# Patient Record
Sex: MALE
[De-identification: names, ages, dates, MRNs, and addresses within clinical notes are randomized per-mention and may not be internally consistent; named-entity substitution may affect disease eponyms.]

---

## 2023-02-14 ENCOUNTER — RX ONLY (OUTPATIENT)
Age: 33
Setting detail: RX ONLY
End: 2023-02-14

## 2023-02-14 ENCOUNTER — APPOINTMENT (RX ONLY)
Dept: URBAN - METROPOLITAN AREA CLINIC 150 | Facility: CLINIC | Age: 33
Setting detail: DERMATOLOGY
End: 2023-02-14

## 2023-02-14 DIAGNOSIS — Z71.89 OTHER SPECIFIED COUNSELING: ICD-10-CM

## 2023-02-14 DIAGNOSIS — B35.3 TINEA PEDIS: ICD-10-CM

## 2023-02-14 PROCEDURE — 99203 OFFICE O/P NEW LOW 30 MIN: CPT

## 2023-02-14 PROCEDURE — ? PRESCRIPTION

## 2023-02-14 PROCEDURE — ? ADDITIONAL NOTES

## 2023-02-14 PROCEDURE — ? COUNSELING

## 2023-02-14 RX ORDER — CICLOPIROX OLAMINE 7.7 MG/G
CREAM TOPICAL
Qty: 90 | Refills: 12 | Status: ERX | COMMUNITY
Start: 2023-02-14

## 2023-02-14 RX ORDER — MICONAZOLE NITRATE 1 %
KIT TOPICAL
Qty: 60 | Refills: 12 | Status: ERX | COMMUNITY
Start: 2023-02-14

## 2023-02-14 ASSESSMENT — LOCATION DETAILED DESCRIPTION DERM
LOCATION DETAILED: RIGHT 3RD TOENAIL
LOCATION DETAILED: RIGHT 4TH TOENAIL
LOCATION DETAILED: LEFT DORSAL GREAT TOE
LOCATION DETAILED: LEFT 3RD TOENAIL
LOCATION DETAILED: RIGHT GREAT TOENAIL

## 2023-02-14 ASSESSMENT — LOCATION ZONE DERM
LOCATION ZONE: TOENAIL
LOCATION ZONE: TOE

## 2023-02-14 ASSESSMENT — LOCATION SIMPLE DESCRIPTION DERM
LOCATION SIMPLE: LEFT GREAT TOE
LOCATION SIMPLE: RIGHT 3RD TOE
LOCATION SIMPLE: RIGHT GREAT TOE
LOCATION SIMPLE: LEFT 3RD TOE
LOCATION SIMPLE: RIGHT 4TH TOE

## 2023-02-14 NOTE — PROCEDURE: ADDITIONAL NOTES
Detail Level: Simple
Additional Notes: 2/14/2023\\nPt will need blood work to check for liver.
Render Risk Assessment In Note?: no

## 2023-02-15 ENCOUNTER — RX ONLY (OUTPATIENT)
Age: 33
Setting detail: RX ONLY
End: 2023-02-15

## 2023-02-15 RX ORDER — TERBINAFINE HYDROCHLORIDE 250 MG/1
TABLET ORAL
Qty: 30 | Refills: 1 | Status: ERX

## 2023-02-15 RX ORDER — TERBINAFINE HYDROCHLORIDE 250 MG/1
TABLET ORAL
Qty: 30 | Refills: 1 | Status: ERX | COMMUNITY
Start: 2023-02-14

## 2023-02-15 RX ORDER — CICLOPIROX OLAMINE 7.7 MG/G
CREAM TOPICAL
Qty: 30 | Refills: 12 | Status: ERX

## 2025-05-21 ENCOUNTER — APPOINTMENT (OUTPATIENT)
Dept: URBAN - METROPOLITAN AREA CLINIC 150 | Facility: CLINIC | Age: 35
Setting detail: DERMATOLOGY
End: 2025-05-21

## 2025-05-21 DIAGNOSIS — L57.8 OTHER SKIN CHANGES DUE TO CHRONIC EXPOSURE TO NONIONIZING RADIATION: ICD-10-CM

## 2025-05-21 DIAGNOSIS — Z71.89 OTHER SPECIFIED COUNSELING: ICD-10-CM

## 2025-05-21 PROBLEM — D23.39 OTHER BENIGN NEOPLASM OF SKIN OF OTHER PARTS OF FACE: Status: ACTIVE | Noted: 2025-05-21

## 2025-05-21 PROCEDURE — 11102 TANGNTL BX SKIN SINGLE LES: CPT

## 2025-05-21 PROCEDURE — 99213 OFFICE O/P EST LOW 20 MIN: CPT | Mod: 25

## 2025-05-21 PROCEDURE — ? BIOPSY BY SHAVE METHOD

## 2025-05-21 PROCEDURE — ? COUNSELING

## 2025-05-21 ASSESSMENT — LOCATION ZONE DERM
LOCATION ZONE: ARM
LOCATION ZONE: FACE

## 2025-05-21 ASSESSMENT — LOCATION SIMPLE DESCRIPTION DERM
LOCATION SIMPLE: LEFT SHOULDER
LOCATION SIMPLE: LEFT FOREHEAD

## 2025-05-21 ASSESSMENT — LOCATION DETAILED DESCRIPTION DERM
LOCATION DETAILED: LEFT MEDIAL FOREHEAD
LOCATION DETAILED: LEFT ANTERIOR SHOULDER

## 2025-05-21 NOTE — HPI: SKIN LESION
Edwige Garcia is a 58 y.o. female     has a past medical history of Acute saddle pulmonary embolism without acute cor pulmonale ([facility] (11/3/2017), Arthritis, Asthma, Chronic obstructive pulmonary disease ([facility] Chronic pain, Diabetes mellitus due to underlying condition, controlled, with complication, without long-term current use of insulin ([facility] (3/21/2017), Diabetic polyneuropathy associated with type 2 diabetes mellitus ([facility] (11/1/2017), Essential hypertension (6/5/2018), Fibromyalgia, Gastroesophageal reflux disease without esophagitis (5/13/2016), Mixed hyperlipidemia (6/5/2018), Moderate episode of recurrent major depressive disorder ([facility] (11/1/2017), Noncompliance (8/6/2018), Severe obesity (BMI 35.0-39.9) (6/5/2018), and Unspecified sleep apnea. Noncompliance: Pt was last seen her >8 months ago. Last time I saw her she had an elevation in Cr. In 08/2018, had given her labs to recheck, it's not done. Pt report dry cough, sinus congestion, at night cough worse, but not worse with lying down. Hx of allergies in the past.   Last smokes 4 years ago. Have ran out of flonase, but she takes OTC allergies pills thinks it's zyrtec. Foot left great toe. She report hx of wart bottom of toes saw podiatry in the past and had it cut out. It reoccurs and she did it herself this time and cuts it too deep. There's bleeing and oozing clear fluid. kingsley on ckd:  Cr. Baseline 1.1, came up to 1.6, GFR 38 8/2018, but this is acute changes? We'll have pt hydrate, recheck labs. Hx of Dm2.  a1c 6.8% 08/2018 at goal, have been noncompliance recently, will refill some of her meds and labs monitoring. continue januvia, metformin    HTN: last visit Hypotension: on Lisnipril/hctz 10/12.5 and propranolol 40mg bid. Refill meds     Anxiety and depression. Was Stable on Wellbutrin and cymbalta. Have ran out of Wellbutrin and cymbalta.   She denies SI or HI.  Continue course.         Reviewed: active problem list, medication list, allergies, notes from last encounter, lab results    A comprehensive review of systems was negative except for that written in the HPI. Allergies   Allergen Reactions    Allopurinol Hives    [de-identified] A500 [Propoxyphene N-Acetaminophen] Hives    Influenza Virus Vaccine, Specific Other (comments)     Allergy documented in admission data base    Seafood [Shellfish Containing Products] Itching     NEW ALLERGY; REACTION WORSENS AS AMOUNT EATEN INCREASES     Current Outpatient Medications on File Prior to Visit   Medication Sig Dispense Refill    Lancets (ACCU-CHEK SOFTCLIX LANCETS) misc Test once daily 100 Each 3    alcohol swabs (BD SINGLE USE SWABS REGULAR) padm Test once daily 100 Pad 3    Blood Glucose Control High&Low (ACCU-CHEK NIVIA CONTROL SOLN) soln As directed 3 Bottle 3    Blood-Glucose Meter (ACCU-CHEK NIVIA PLUS METER) misc Test once daily 1 Each 0    glucose blood VI test strips (ACCU-CHEK NIVIA PLUS TEST STRP) strip Test once daily 100 Strip 3    cholecalciferol, vitamin D3, (VITAMIN D3) 2,000 unit tab Take 1 Tab by mouth daily.  butalbital-acetaminophen-caffeine (ESGIC) -40 mg per tablet Take 1 Tab by mouth every six (6) hours as needed for Pain. 15 Tab 0     No current facility-administered medications on file prior to visit.       Patient Active Problem List   Diagnosis Code    Lumbar stenosis M48.061    Asthma J45.909    Primary localized osteoarthrosis, lower leg M17.10    DJD (degenerative joint disease) of knee M17.10    Chronic pain G89.29    Fibromyalgia M79.7    COPD (chronic obstructive pulmonary disease) ([facility]) J44.9    Gastroesophageal reflux disease without esophagitis K21.9    Chronic low back pain with bilateral sciatica M54.41, M54.42, G89.29    Lumbar spondylosis M47.816    Spinal stenosis M48.00    Status post laminectomy Z98.890    Diabetes mellitus due to underlying condition, controlled, with complication, without Has Your Skin Lesion Been Treated?: not been treated long-term current use of insulin ([facility]) E08.8    Acute pulmonary embolism ([facility]) I26.99    Moderate episode of recurrent major depressive disorder ([facility] F33.1    Diabetic polyneuropathy associated with type 2 diabetes mellitus ([facility]) E11.42    Acute saddle pulmonary embolism without acute cor pulmonale ([facility]) I26.92    Severe obesity (BMI 35.0-39. 9) E66.01    Essential hypertension I10    Mixed hyperlipidemia E78.2    Type 2 diabetes with nephropathy ([facility]) E11.21    Noncompliance Z91.19    Chronic anticoagulation Z79.01       Visit Vitals  /59   Pulse 95   Temp 96.3 °F (35.7 °C) (Oral)   Resp 20   Ht 6' 1\" (1.854 m)   Wt 296 lb 9.6 oz (134.5 kg)   SpO2 98%   BMI 39.13 kg/m²     General appearance: alert, cooperative, no distress, appears stated age  Neurologic: Alert and oriented X 3, normal strength and tone, symmetric. Normal without focal findings. Cranial nerves 2-12 intact. Normal coordination and gait. Mental status: Alert, oriented, thought content appropriate, affect: stable, mood-congruent. Head: Normocephalic, without obvious abnormality, atraumatic   Less sensitive to touch compares to last time of her skin at frontal right temporal area. Skin normal.   Eyes: conjunctivae/corneas clear. PERRL, EOM's intact. Ears: bilat TM and canals WNL  Throat: normal  Neck: supple, symmetrical, trachea midline, no JVD  Lungs: clear to auscultation bilaterally  Heart: regular rate and rhythm, S1, S2 normal, no murmur, click, rub or gallop  Abdomen: soft, non-tender. Extremities: Left foot plantar surface, in crease of great toe she had cut herself too deep. There's bleeing and oozing clear fluid. No redness. Assessment/Plans:    Diagnoses and all orders for this visit:    1. Noncompliance    2. Essential hypertension with goal blood pressure less than 140/90  -     lisinopril-hydroCHLOROthiazide (PRINZIDE, ZESTORETIC) 10-12.5 mg per tablet;  Take 1 Tab by mouth daily.  -     CBC W/O DIFF  - HEMOGLOBIN A1C W/O EAG  -     LIPID PANEL  -     METABOLIC PANEL, COMPREHENSIVE  -     TSH RFX ON ABNORMAL TO FREE T4  -     MICROALBUMIN, UR, RAND W/ MICROALB/CREAT RATIO    3. Diabetic polyneuropathy associated with type 2 diabetes mellitus ([facility])  -     gabapentin (NEURONTIN) 300 mg capsule; Take 1 Cap by mouth two (2) times a day. TAKE 1 CAPSULE BY MOUTH THREE TIMES DAILY  -     REFERRAL TO PODIATRY    4. Essential tremor  -     propranolol (INDERAL) 40 mg tablet; Take 1 Tab by mouth two (2) times a day. 5. Other emphysema ([facility]  -     fluticasone propion-salmeterol (ADVAIR) 250-50 mcg/dose diskus inhaler; Take 1 Puff by inhalation every twelve (12) hours. -     fluticasone propionate (FLONASE) 50 mcg/actuation nasal spray; INSTILL 2 SPRAYS IN EACH NOSTRIL EVERY DAY    6. Gastroesophageal reflux disease without esophagitis  -     omeprazole (PRILOSEC) 20 mg capsule; TAKE 1 CAPSULE BY MOUTH EVERY DAY    7. Controlled type 2 diabetes mellitus with diabetic nephropathy, without long-term current use of insulin ([facility])  -     SITagliptin (JANUVIA) 100 mg tablet; Take 1 Tab by mouth daily. 8. Diabetes mellitus due to underlying condition, controlled, with complication, without long-term current use of insulin ([facility])  -     metFORMIN (GLUCOPHAGE) 1,000 mg tablet; 500mg in am and 1000 mg pm  -     REFERRAL TO PODIATRY  -     CBC W/O DIFF  -     HEMOGLOBIN A1C W/O EAG  -     LIPID PANEL  -     METABOLIC PANEL, COMPREHENSIVE  -     TSH RFX ON ABNORMAL TO FREE T4  -     MICROALBUMIN, UR, RAND W/ MICROALB/CREAT RATIO    9. Chronic obstructive pulmonary disease, unspecified COPD type ([facility]  -     albuterol-ipratropium (DUO-NEB) 2.5 mg-0.5 mg/3 ml nebu; INHALE 1 VIAL VIA NEBULIZER EVERY 4 HOURS AS NEEDED    10. Fibromyalgia  -     DULoxetine (CYMBALTA) 30 mg capsule; Take 1 Cap by mouth two (2) times a day.     11. Moderate episode of recurrent major depressive disorder (HCC)  -     DULoxetine (CYMBALTA) 30 mg capsule; Take 1 Cap by mouth two (2) times a day. -     buPROPion (WELLBUTRIN) 100 mg tablet; Take 1 Tab by mouth two (2) times a day. 12. At risk for diabetic foot ulcer  -     levoFLOXacin (LEVAQUIN) 500 mg tablet; Take 1 Tab by mouth daily for 7 days.  -     REFERRAL TO PODIATRY  -     CBC W/O DIFF  -     HEMOGLOBIN A1C W/O EAG  -     LIPID PANEL  -     METABOLIC PANEL, COMPREHENSIVE  -     TSH RFX ON ABNORMAL TO FREE T4  -     MICROALBUMIN, UR, RAND W/ MICROALB/CREAT RATIO    13. Encounter for long-term (current) use of medications  -     CBC W/O DIFF  -     HEMOGLOBIN A1C W/O EAG  -     LIPID PANEL  -     METABOLIC PANEL, COMPREHENSIVE  -     TSH RFX ON ABNORMAL TO FREE T4  -     MICROALBUMIN, UR, RAND W/ MICROALB/CREAT RATIO    14. Essential hypertension  -     CBC W/O DIFF  -     HEMOGLOBIN A1C W/O EAG  -     LIPID PANEL  -     METABOLIC PANEL, COMPREHENSIVE  -     TSH RFX ON ABNORMAL TO FREE T4  -     MICROALBUMIN, UR, RAND W/ MICROALB/CREAT RATIO    15. Mixed hyperlipidemia  -     gemfibrozil (LOPID) 600 mg tablet; Take 1 Tab by mouth two (2) times a day. Other orders  -     acyclovir (ZOVIRAX) 400 mg tablet; TAKE 1 TABLET BY MOUTH TWICE DAILY  -     potassium chloride SR (KLOR-CON 10) 10 mEq tablet; TK 1 T PO QD  -     albuterol (PROAIR HFA) 90 mcg/actuation inhaler; Take 2 Puffs by inhalation every four (4) hours as needed for Wheezing. Discussed plans, risk/benefits of treatments/observations. Through the use of shared decision making, above plans were agreed upon. Medication compliance advised. Patient verbalized understanding. Follow-up and Dispositions    · Return in about 2 weeks (around 4/25/2019) for DM2, HTN, HLD, anxiety, meds, labs.            Sushma Worthington MD  4/11/2019